# Patient Record
Sex: MALE | URBAN - METROPOLITAN AREA
[De-identification: names, ages, dates, MRNs, and addresses within clinical notes are randomized per-mention and may not be internally consistent; named-entity substitution may affect disease eponyms.]

---

## 2019-12-06 ENCOUNTER — APPOINTMENT (OUTPATIENT)
Age: 39
Setting detail: DERMATOLOGY
End: 2019-12-10

## 2019-12-06 DIAGNOSIS — B35.1 TINEA UNGUIUM: ICD-10-CM

## 2019-12-06 PROCEDURE — OTHER ORDER TESTS: OTHER

## 2019-12-06 PROCEDURE — OTHER COUNSELING: OTHER

## 2019-12-06 PROCEDURE — OTHER MEDICATION COUNSELING: OTHER

## 2019-12-06 PROCEDURE — 99202 OFFICE O/P NEW SF 15 MIN: CPT

## 2019-12-06 PROCEDURE — OTHER PRESCRIPTION: OTHER

## 2019-12-06 ASSESSMENT — LOCATION ZONE DERM: LOCATION ZONE: TOE

## 2019-12-06 ASSESSMENT — LOCATION SIMPLE DESCRIPTION DERM: LOCATION SIMPLE: LEFT GREAT TOE

## 2019-12-06 ASSESSMENT — LOCATION DETAILED DESCRIPTION DERM: LOCATION DETAILED: LEFT DISTAL PLANTAR GREAT TOE

## 2019-12-06 NOTE — PROCEDURE: MEDICATION COUNSELING
Xelevelinez Pregnancy And Lactation Text: This medication is Pregnancy Category D and is not considered safe during pregnancy.  The risk during breast feeding is also uncertain.

## 2019-12-25 NOTE — PROCEDURE: MEDICATION COUNSELING
37.1 Minocycline Pregnancy And Lactation Text: This medication is Pregnancy Category D and not consider safe during pregnancy. It is also excreted in breast milk.

## 2021-10-06 NOTE — PROCEDURE: MEDICATION COUNSELING
Review of Systems   Eyes: Positive for eye problems.   Musculoskeletal: Positive for arthralgias and back pain (chronic).   Neurological: Positive for numbness (hx diabetic).   All other systems reviewed and are negative.     Topical Sulfur Applications Counseling: Topical Sulfur Counseling: Patient counseled that this medication may cause skin irritation or allergic reactions.  In the event of skin irritation, the patient was advised to reduce the amount of the drug applied or use it less frequently.   The patient verbalized understanding of the proper use and possible adverse effects of topical sulfur application.  All of the patient's questions and concerns were addressed.

## 2022-09-27 NOTE — PROCEDURE: MEDICATION COUNSELING
He will continue on 3 L nasal cannula oxygen  Goal is always to remain greater than 88%  Finasteride Pregnancy And Lactation Text: This medication is absolutely contraindicated during pregnancy. It is unknown if it is excreted in breast milk.